# Patient Record
Sex: MALE | Race: BLACK OR AFRICAN AMERICAN | NOT HISPANIC OR LATINO | Employment: FULL TIME | URBAN - METROPOLITAN AREA
[De-identification: names, ages, dates, MRNs, and addresses within clinical notes are randomized per-mention and may not be internally consistent; named-entity substitution may affect disease eponyms.]

---

## 2023-11-06 ENCOUNTER — APPOINTMENT (OUTPATIENT)
Dept: RADIOLOGY | Facility: HOSPITAL | Age: 78
End: 2023-11-06
Payer: COMMERCIAL

## 2023-11-06 ENCOUNTER — HOSPITAL ENCOUNTER (EMERGENCY)
Facility: HOSPITAL | Age: 78
Discharge: HOME | End: 2023-11-07
Attending: EMERGENCY MEDICINE
Payer: COMMERCIAL

## 2023-11-06 DIAGNOSIS — R06.00 DYSPNEA, UNSPECIFIED TYPE: Primary | ICD-10-CM

## 2023-11-06 LAB
ALBUMIN SERPL BCP-MCNC: 4.1 G/DL (ref 3.4–5)
ALP SERPL-CCNC: 83 U/L (ref 33–136)
ALT SERPL W P-5'-P-CCNC: 22 U/L (ref 10–52)
ANION GAP BLDV CALCULATED.4IONS-SCNC: 12 MMOL/L (ref 10–25)
ANION GAP SERPL CALC-SCNC: 17 MMOL/L (ref 10–20)
APPEARANCE UR: ABNORMAL
AST SERPL W P-5'-P-CCNC: 15 U/L (ref 9–39)
BACTERIA #/AREA URNS AUTO: ABNORMAL /HPF
BASE EXCESS BLDV CALC-SCNC: 3.3 MMOL/L (ref -2–3)
BASOPHILS # BLD AUTO: 0.01 X10*3/UL (ref 0–0.1)
BASOPHILS NFR BLD AUTO: 0.1 %
BILIRUB DIRECT SERPL-MCNC: 0.2 MG/DL (ref 0–0.3)
BILIRUB SERPL-MCNC: 0.6 MG/DL (ref 0–1.2)
BILIRUB UR STRIP.AUTO-MCNC: NEGATIVE MG/DL
BNP SERPL-MCNC: 133 PG/ML (ref 0–99)
BODY TEMPERATURE: 37 DEGREES CELSIUS
BUN SERPL-MCNC: 19 MG/DL (ref 6–23)
CA-I BLDV-SCNC: 1.11 MMOL/L (ref 1.1–1.33)
CALCIUM SERPL-MCNC: 9.2 MG/DL (ref 8.6–10.3)
CARDIAC TROPONIN I PNL SERPL HS: <3 NG/L (ref 0–20)
CHLORIDE BLDV-SCNC: 100 MMOL/L (ref 98–107)
CHLORIDE SERPL-SCNC: 100 MMOL/L (ref 98–107)
CO2 SERPL-SCNC: 25 MMOL/L (ref 21–32)
COLOR UR: YELLOW
CREAT SERPL-MCNC: 1.23 MG/DL (ref 0.5–1.3)
D DIMER PPP FEU-MCNC: 380 NG/ML FEU
EOSINOPHIL # BLD AUTO: 0.08 X10*3/UL (ref 0–0.4)
EOSINOPHIL NFR BLD AUTO: 1 %
ERYTHROCYTE [DISTWIDTH] IN BLOOD BY AUTOMATED COUNT: 13.5 % (ref 11.5–14.5)
GFR SERPL CREATININE-BSD FRML MDRD: 61 ML/MIN/1.73M*2
GLUCOSE BLDV-MCNC: 149 MG/DL (ref 74–99)
GLUCOSE SERPL-MCNC: 137 MG/DL (ref 74–99)
GLUCOSE UR STRIP.AUTO-MCNC: NEGATIVE MG/DL
HCO3 BLDV-SCNC: 27.8 MMOL/L (ref 22–26)
HCT VFR BLD AUTO: 42.6 % (ref 41–52)
HCT VFR BLD EST: 48 % (ref 41–52)
HGB BLD-MCNC: 13.9 G/DL (ref 13.5–17.5)
HGB BLDV-MCNC: 15.9 G/DL (ref 13.5–17.5)
HYALINE CASTS #/AREA URNS AUTO: ABNORMAL /LPF
IMM GRANULOCYTES # BLD AUTO: 0.03 X10*3/UL (ref 0–0.5)
IMM GRANULOCYTES NFR BLD AUTO: 0.4 % (ref 0–0.9)
INHALED O2 CONCENTRATION: 21 %
INR PPP: 4.1 (ref 0.9–1.1)
KETONES UR STRIP.AUTO-MCNC: ABNORMAL MG/DL
LACTATE BLDV-SCNC: 2.1 MMOL/L (ref 0.4–2)
LACTATE BLDV-SCNC: 2.1 MMOL/L (ref 0.4–2)
LEUKOCYTE ESTERASE UR QL STRIP.AUTO: NEGATIVE
LYMPHOCYTES # BLD AUTO: 0.99 X10*3/UL (ref 0.8–3)
LYMPHOCYTES NFR BLD AUTO: 12 %
MAGNESIUM SERPL-MCNC: 1.9 MG/DL (ref 1.6–2.4)
MCH RBC QN AUTO: 28 PG (ref 26–34)
MCHC RBC AUTO-ENTMCNC: 32.6 G/DL (ref 32–36)
MCV RBC AUTO: 86 FL (ref 80–100)
MONOCYTES # BLD AUTO: 0.77 X10*3/UL (ref 0.05–0.8)
MONOCYTES NFR BLD AUTO: 9.3 %
MUCOUS THREADS #/AREA URNS AUTO: ABNORMAL /LPF
NEUTROPHILS # BLD AUTO: 6.39 X10*3/UL (ref 1.6–5.5)
NEUTROPHILS NFR BLD AUTO: 77.2 %
NITRITE UR QL STRIP.AUTO: NEGATIVE
NRBC BLD-RTO: 0 /100 WBCS (ref 0–0)
OXYHGB MFR BLDV: 38.5 % (ref 45–75)
PCO2 BLDV: 41 MM HG (ref 41–51)
PH BLDV: 7.44 PH (ref 7.33–7.43)
PH UR STRIP.AUTO: 5 [PH]
PLATELET # BLD AUTO: 231 X10*3/UL (ref 150–450)
PO2 BLDV: 31 MM HG (ref 35–45)
POTASSIUM BLDV-SCNC: 3.4 MMOL/L (ref 3.5–5.3)
POTASSIUM SERPL-SCNC: 3.5 MMOL/L (ref 3.5–5.3)
PROT SERPL-MCNC: 7.3 G/DL (ref 6.4–8.2)
PROT UR STRIP.AUTO-MCNC: ABNORMAL MG/DL
PROTHROMBIN TIME: 47.3 SECONDS (ref 9.8–12.8)
RBC # BLD AUTO: 4.96 X10*6/UL (ref 4.5–5.9)
RBC # UR STRIP.AUTO: NEGATIVE /UL
RBC #/AREA URNS AUTO: ABNORMAL /HPF
SAO2 % BLDV: 39 % (ref 45–75)
SODIUM BLDV-SCNC: 136 MMOL/L (ref 136–145)
SODIUM SERPL-SCNC: 138 MMOL/L (ref 136–145)
SP GR UR STRIP.AUTO: 1.03
UROBILINOGEN UR STRIP.AUTO-MCNC: <2 MG/DL
WBC # BLD AUTO: 8.3 X10*3/UL (ref 4.4–11.3)
WBC #/AREA URNS AUTO: ABNORMAL /HPF

## 2023-11-06 PROCEDURE — 71045 X-RAY EXAM CHEST 1 VIEW: CPT | Mod: FY

## 2023-11-06 PROCEDURE — 83735 ASSAY OF MAGNESIUM: CPT | Performed by: EMERGENCY MEDICINE

## 2023-11-06 PROCEDURE — 83880 ASSAY OF NATRIURETIC PEPTIDE: CPT | Performed by: EMERGENCY MEDICINE

## 2023-11-06 PROCEDURE — 2550000001 HC RX 255 CONTRASTS: Performed by: EMERGENCY MEDICINE

## 2023-11-06 PROCEDURE — 82947 ASSAY GLUCOSE BLOOD QUANT: CPT | Performed by: EMERGENCY MEDICINE

## 2023-11-06 PROCEDURE — 74177 CT ABD & PELVIS W/CONTRAST: CPT

## 2023-11-06 PROCEDURE — 81001 URINALYSIS AUTO W/SCOPE: CPT | Performed by: EMERGENCY MEDICINE

## 2023-11-06 PROCEDURE — 96360 HYDRATION IV INFUSION INIT: CPT

## 2023-11-06 PROCEDURE — 71045 X-RAY EXAM CHEST 1 VIEW: CPT | Performed by: RADIOLOGY

## 2023-11-06 PROCEDURE — 36415 COLL VENOUS BLD VENIPUNCTURE: CPT | Performed by: EMERGENCY MEDICINE

## 2023-11-06 PROCEDURE — 85025 COMPLETE CBC W/AUTO DIFF WBC: CPT | Performed by: EMERGENCY MEDICINE

## 2023-11-06 PROCEDURE — 71275 CT ANGIOGRAPHY CHEST: CPT | Performed by: RADIOLOGY

## 2023-11-06 PROCEDURE — 99221 1ST HOSP IP/OBS SF/LOW 40: CPT

## 2023-11-06 PROCEDURE — 74177 CT ABD & PELVIS W/CONTRAST: CPT | Performed by: RADIOLOGY

## 2023-11-06 PROCEDURE — 71275 CT ANGIOGRAPHY CHEST: CPT

## 2023-11-06 PROCEDURE — 85610 PROTHROMBIN TIME: CPT | Performed by: EMERGENCY MEDICINE

## 2023-11-06 PROCEDURE — 2500000004 HC RX 250 GENERAL PHARMACY W/ HCPCS (ALT 636 FOR OP/ED): Performed by: EMERGENCY MEDICINE

## 2023-11-06 PROCEDURE — 85379 FIBRIN DEGRADATION QUANT: CPT | Performed by: EMERGENCY MEDICINE

## 2023-11-06 PROCEDURE — 84484 ASSAY OF TROPONIN QUANT: CPT | Performed by: EMERGENCY MEDICINE

## 2023-11-06 PROCEDURE — 82248 BILIRUBIN DIRECT: CPT | Performed by: EMERGENCY MEDICINE

## 2023-11-06 PROCEDURE — 83605 ASSAY OF LACTIC ACID: CPT | Performed by: EMERGENCY MEDICINE

## 2023-11-06 PROCEDURE — 99285 EMERGENCY DEPT VISIT HI MDM: CPT | Mod: 25

## 2023-11-06 RX ADMIN — SODIUM CHLORIDE, POTASSIUM CHLORIDE, SODIUM LACTATE AND CALCIUM CHLORIDE 500 ML: 600; 310; 30; 20 INJECTION, SOLUTION INTRAVENOUS at 14:24

## 2023-11-06 RX ADMIN — IOHEXOL 75 ML: 350 INJECTION, SOLUTION INTRAVENOUS at 20:32

## 2023-11-06 ASSESSMENT — PAIN - FUNCTIONAL ASSESSMENT: PAIN_FUNCTIONAL_ASSESSMENT: 0-10

## 2023-11-06 ASSESSMENT — PAIN SCALES - GENERAL: PAINLEVEL_OUTOF10: 0 - NO PAIN

## 2023-11-06 NOTE — ED TRIAGE NOTES
PT ARRIVED TO TRIAGE FOR SOB. PT HAVING DIFFICULTIES SPEAKING FULL SENTENCES. PT IN WHEELCHAIR BECAUSE HE'S FEELING SOB. WIFE STS PT HAS BEEN FEELING SOB FOR THE LAST 4 DAYS AND IS NOT GETTING ANY BETTER. PT ALSO COMPLAINING OF D/N, W/O V. PT BOARDERLINE TACHY IN TRIAGE. EKG IN TRIAGE

## 2023-11-06 NOTE — PROGRESS NOTES
Transfer of care note:    I received this patient in signout -   ED Course as of 11/10/23 1735   Mon Nov 06, 2023 1815 Patient EKG on my interpretation shows normal sinus rhythm normal axis rate mid 70s occasional PVCs with no ischemic changes.  CBC today shows no leukocytosis chemistries are unremarkable troponin negative BNP is almost normal D-dimer is negative INR therapeutic 4.1 however patient is still having trouble breathing, given ongoing symptoms we will do a CT angio of the chest, [MT]   1857 Patient presented to me pending CT chest abdomen pelvis.  Having shortness of breath with overall reassuring work-up.  History of PE on Coumadin therapeutic.  Some abdominal distention left upper quadrant pain on exam. [JM]   2205 CT concerning for ileus.  Surgery consulted. [JM]   2331 Patient had 3 somewhat loose bowel movements in the ED and abdominal distention discomfort resolved.  Shortness of breath returned to baseline.  Feels slightly fatigued but feels comfortable at discharge.  Hemodynamically stable.  Satting well on room air. [JM]      ED Course User Index  [JM] Lázaro Gallegos MD  [MT] Chris Cantu MD         Diagnoses as of 11/10/23 1735   Dyspnea, unspecified type     Given patient's shortness of breath symptoms resolved when the abdominal distention resolved secondary to having bowel movements, suspect patient's abdominal distention and CT findings with dilated bowel loops were more related to enteritis which is now improved with bowel movements.  Low suspicion for significant ileus or bowel obstruction.  Patient has had multiple large bowel movements now in the ED and feels back to baseline.  Patient feels comfortable with discharge which I believe is reasonable given resolution of symptoms and overall clinical picture.  Patient remains nontoxic-appearing.  Surgery team notified of the change in status.  Surgery team evaluated patient and agrees.  Patient was explained findings, exam/study  results, the importance of follow up and the plan moving forward; patient verbalized understanding and agreement. All questions were answered and no new questions at this time. Patient will be discharged with strict return precautions, follow up plan with PCP.

## 2023-11-06 NOTE — ED PROVIDER NOTES
HPI   Chief Complaint   Patient presents with   • Shortness of Breath       HPI: []  78-year-old  male with a history of hypertension, pulm embolism on Coumadin today comes in with shortness of breath.  History for last 2 days he been having trouble breathing.  Denies any chest pain pressure heaviness.  No orthopnea PND.  No hematemesis melena or hematochezia.  He just feels he is having trouble catching his breath.  He is compliant to medications.  He denies recent travel hospitalization.  No leg swelling.  No headache.  No trauma no falls.    Past history: Hypertension, pulm embolism  Social: Patient denies current tobacco alcohol drug abuse.  REVIEW OF SYSTEMS:    GENERAL.: No weight loss, fatigue, anorexia, insomnia, fever.    EYES: No vision loss, double vision, drainage, eye pain.    ENT: No pharyngitis, dry mouth.    CARDIOPULMONARY: No chest pain, palpitations, syncope, near syncope.  Positive for shortness of breath, cough, hemoptysis.    GI: No abdominal pain, change in bowel habits, melena, hematemesis, hematochezia, nausea, vomiting, diarrhea.    : No discharge, dysuria, frequency, urgency, hematuria.    MS: No limb pain, joint pain, joint swelling.    SKIN: No rashes.    PSYCH: No depression, anxiety, suicidality, homicidality.    Review of systems is otherwise negative unless stated above or in history of present illness.  Social history, family history, allergies reviewed.  PHYSICAL EXAM:    GENERAL: Vitals noted, no distress. Alert and oriented  x 3. Non-toxic.      EENT: TMs clear. Posterior oropharynx unremarkable. No meningismus. No LAD.     NECK: Supple. Nontender. No midline tenderness.     CARDIAC: Regular, rate, rhythm. No murmurs rubs or gallops. No JVD    PULMONARY: Lungs clear bilaterally with good aeration. No wheezes rales or rhonchi. No respiratory distress.  No tachypnea stridor or retractions able to speak in full sentences    ABDOMEN: Soft, distended, minimal diffuse  tenderness especially left upper quadrant no rebound or guarding no peritoneal signs. Normoactive bowel sounds. No pulsatile masses.  Negative inguinal hernias negative CVA tenderness    EXTREMITIES: No peripheral edema. Negative Homans bilaterally, no cords.  2+ bounding pulses well-perfused    SKIN: No rash. Intact.     NEURO: No focal neurologic deficits, NIH score of 0. Cranial nerves normal as tested from II through XII.     MEDICAL DECISION MAKING:  EKG on my interpretation shows normal sinus rhythm normal axis rate mid 80s with a occasional PVCs.    CBC today shows leukocytosis stable hemoglobin chemistries unremarkable lactate 2.1 elevated venous gas shows no hypercapnia troponin negative BNP is almost normal chest x-ray negative, INR is 4.1 therapeutic CT angio of the chest/abdomen/pelvis is pending.    Treatment in ED: Arrival IV established, placed on a cardiac monitor.    ED course: Patient remained stable hemodynamic.    Impression: #1 shortness of breath    Plan set MDM: Elderly male history for pulm embolism on Coumadin compliant therapeutic INR 4.1 D-dimer negative comes in with ongoing shortness of breath still symptomatic although suspicion for breakthrough PE is low but given his ongoing symptoms and abdominal distention awaiting CT of the chest abdomen pelvis, following patient be reassessed again anticipate if the CT is negative will be discharged home, signed out to my oncoming colleague Dr. Baker                               Freddie Coma Scale Score: 15                  Patient History   No past medical history on file.  No past surgical history on file.  No family history on file.  Social History     Tobacco Use   • Smoking status: Not on file   • Smokeless tobacco: Not on file   Substance Use Topics   • Alcohol use: Not on file   • Drug use: Not on file       Physical Exam   ED Triage Vitals [11/06/23 1317]   Temp Heart Rate Resp BP   36.8 °C (98.3 °F) 97 22 141/80      SpO2 Temp Source  Heart Rate Source Patient Position   100 % Oral -- Lying      BP Location FiO2 (%)     Right arm --       Physical Exam    ED Course & [facility]   ED Course as of 11/06/23 1820 Mon Nov 06, 2023 1815 Patient EKG on my interpretation shows normal sinus rhythm normal axis rate mid 70s occasional PVCs with no ischemic changes.  CBC today shows no leukocytosis chemistries are unremarkable troponin negative BNP is almost normal D-dimer is negative INR therapeutic 4.1 however patient is still having trouble breathing, given ongoing symptoms we will do a CT angio of the chest, [MT]      ED Course User Index  [MT] Chris Cantu MD         Diagnoses as of 11/06/23 1820   Dyspnea, unspecified type       Medical Decision Making      Procedure  Procedures     Chris Cantu MD  11/06/23 1820       Chris Cantu MD  11/06/23 1821

## 2023-11-07 VITALS
WEIGHT: 250 LBS | OXYGEN SATURATION: 98 % | SYSTOLIC BLOOD PRESSURE: 138 MMHG | DIASTOLIC BLOOD PRESSURE: 88 MMHG | TEMPERATURE: 98.3 F | RESPIRATION RATE: 22 BRPM | HEART RATE: 90 BPM | BODY MASS INDEX: 30.44 KG/M2 | HEIGHT: 76 IN

## 2023-11-07 NOTE — CONSULTS
Reason For Consult  Abdominal distention, concern for ileus    History Of Present Illness  Linda Stephen is a 78 y.o. male presenting with SOB x2 days. He is also endorsing abdominal distention. He states he does have occasional constipation. He took Miralax last week and was able to have a BM. He noted abdominal distention 2 days ago, which self-resolved. He noted the abdominal distention again today with associated nausea without vomiting. He states that since he has been in the ED, he has had at least 4 liquid bowel movements. He had a colonoscopy in 2017 - no concerning findings. In the ED, he is afebrile. Glucose 137, otherwise electrolytes normal. WBC 8.3, H/H 13.9/42.6. CT A/P demonstrated a distended stomach with an air-fluid level. Fluid distention of multiple small bowel loops with no definite transition point identified, may be secondary to ileus or enteritis. Due to possible ileus, general surgery was consulted.     Upon examination, the patient is no longer endorsing N/V. He states he is hungry. States he has had at least 4 episodes of diarrhea since presenting to the ED. His abdomen is mildly distended, soft and non-tender to palpation. He denies any prior abdominal surgeries.     Past Medical History  HTN  PE on Coumadin    Surgical History  Hip surgery     Social History  He has no history on file for tobacco use, alcohol use, and drug use.    Family History  No family history on file.     Allergies  Patient has no known allergies.    Review of Systems  ABD: + diarrhea. Denies pain, N/V.     Physical Exam  Constitutional: Awake/alert/oriented x3, no distress, cooperative.  Skin: Warm and dry.  Eyes: EOMI, clear sclera.  ENMT: Mucus membranes moist, no apparent injury.  Head/Neck: Neck supple, no apparent injury.  Respiratory: Unlabored breathing on room air.  Cardiovascular: RRR.  GI: Mildly distended, soft, non tender to palpation, non-peritonitic.  Musculoskeletal: ROM intact, normal  "strength.  Extremities: DA SILVA.  Neurological: Alert and oriented x3, no focal deficits.  Psychological: Appropriate mood and behavior.     Last Recorded Vitals  Blood pressure (!) 143/94, pulse 89, temperature 36.8 °C (98.3 °F), temperature source Oral, resp. rate 21, height 1.93 m (6' 4\"), weight 113 kg (250 lb), SpO2 97 %.    Relevant Results  .  Results for orders placed or performed during the hospital encounter of 11/06/23 (from the past 24 hour(s))   CBC and Auto Differential   Result Value Ref Range    WBC 8.3 4.4 - 11.3 x10*3/uL    nRBC 0.0 0.0 - 0.0 /100 WBCs    RBC 4.96 4.50 - 5.90 x10*6/uL    Hemoglobin 13.9 13.5 - 17.5 g/dL    Hematocrit 42.6 41.0 - 52.0 %    MCV 86 80 - 100 fL    MCH 28.0 26.0 - 34.0 pg    MCHC 32.6 32.0 - 36.0 g/dL    RDW 13.5 11.5 - 14.5 %    Platelets 231 150 - 450 x10*3/uL    Neutrophils % 77.2 40.0 - 80.0 %    Immature Granulocytes %, Automated 0.4 0.0 - 0.9 %    Lymphocytes % 12.0 13.0 - 44.0 %    Monocytes % 9.3 2.0 - 10.0 %    Eosinophils % 1.0 0.0 - 6.0 %    Basophils % 0.1 0.0 - 2.0 %    Neutrophils Absolute 6.39 (H) 1.60 - 5.50 x10*3/uL    Immature Granulocytes Absolute, Automated 0.03 0.00 - 0.50 x10*3/uL    Lymphocytes Absolute 0.99 0.80 - 3.00 x10*3/uL    Monocytes Absolute 0.77 0.05 - 0.80 x10*3/uL    Eosinophils Absolute 0.08 0.00 - 0.40 x10*3/uL    Basophils Absolute 0.01 0.00 - 0.10 x10*3/uL   Basic metabolic panel   Result Value Ref Range    Glucose 137 (H) 74 - 99 mg/dL    Sodium 138 136 - 145 mmol/L    Potassium 3.5 3.5 - 5.3 mmol/L    Chloride 100 98 - 107 mmol/L    Bicarbonate 25 21 - 32 mmol/L    Anion Gap 17 10 - 20 mmol/L    Urea Nitrogen 19 6 - 23 mg/dL    Creatinine 1.23 0.50 - 1.30 mg/dL    eGFR 61 >60 mL/min/1.73m*2    Calcium 9.2 8.6 - 10.3 mg/dL   Magnesium   Result Value Ref Range    Magnesium 1.90 1.60 - 2.40 mg/dL   Hepatic function panel   Result Value Ref Range    Albumin 4.1 3.4 - 5.0 g/dL    Bilirubin, Total 0.6 0.0 - 1.2 mg/dL    Bilirubin, Direct " 0.2 0.0 - 0.3 mg/dL    Alkaline Phosphatase 83 33 - 136 U/L    ALT 22 10 - 52 U/L    AST 15 9 - 39 U/L    Total Protein 7.3 6.4 - 8.2 g/dL   Protime-INR   Result Value Ref Range    Protime 47.3 (H) 9.8 - 12.8 seconds    INR 4.1 (H) 0.9 - 1.1   Troponin I, High Sensitivity   Result Value Ref Range    Troponin I, High Sensitivity <3 0 - 20 ng/L   D-Dimer, Quantitative Non VTE   Result Value Ref Range    D-Dimer Non VTE, Quant (ng/mL FEU) 380 <=500 ng/mL FEU   B-Type Natriuretic Peptide   Result Value Ref Range     (H) 0 - 99 pg/mL   Blood Gas Venous Full Panel   Result Value Ref Range    POCT pH, Venous 7.44 (H) 7.33 - 7.43 pH    POCT pCO2, Venous 41 41 - 51 mm Hg    POCT pO2, Venous 31 (L) 35 - 45 mm Hg    POCT SO2, Venous 39 (L) 45 - 75 %    POCT Oxy Hemoglobin, Venous 38.5 (L) 45.0 - 75.0 %    POCT Hematocrit Calculated, Venous 48.0 41.0 - 52.0 %    POCT Sodium, Venous 136 136 - 145 mmol/L    POCT Potassium, Venous 3.4 (L) 3.5 - 5.3 mmol/L    POCT Chloride, Venous 100 98 - 107 mmol/L    POCT Ionized Calicum, Venous 1.11 1.10 - 1.33 mmol/L    POCT Glucose, Venous 149 (H) 74 - 99 mg/dL    POCT Lactate, Venous 2.1 (H) 0.4 - 2.0 mmol/L    POCT Base Excess, Venous 3.3 (H) -2.0 - 3.0 mmol/L    POCT HCO3 Calculated, Venous 27.8 (H) 22.0 - 26.0 mmol/L    POCT Hemoglobin, Venous 15.9 13.5 - 17.5 g/dL    POCT Anion Gap, Venous 12.0 10.0 - 25.0 mmol/L    Patient Temperature 37.0 degrees Celsius    FiO2 21 %   Urinalysis with Reflex Microscopic   Result Value Ref Range    Color, Urine Yellow Straw, Yellow    Appearance, Urine Hazy (N) Clear    Specific Gravity, Urine 1.029 1.005 - 1.035    pH, Urine 5.0 5.0, 5.5, 6.0, 6.5, 7.0, 7.5, 8.0    Protein, Urine 30 (1+) (N) NEGATIVE mg/dL    Glucose, Urine NEGATIVE NEGATIVE mg/dL    Blood, Urine NEGATIVE NEGATIVE    Ketones, Urine 80 (2+) (A) NEGATIVE mg/dL    Bilirubin, Urine NEGATIVE NEGATIVE    Urobilinogen, Urine <2.0 <2.0 mg/dL    Nitrite, Urine NEGATIVE NEGATIVE     Leukocyte Esterase, Urine NEGATIVE NEGATIVE   Blood Gas Lactic Acid, Venous   Result Value Ref Range    POCT Lactate, Venous 2.1 (H) 0.4 - 2.0 mmol/L   Microscopic Only, Urine   Result Value Ref Range    WBC, Urine 1-5 1-5, NONE /HPF    RBC, Urine 1-2 NONE, 1-2, 3-5 /HPF    Bacteria, Urine 1+ (A) NONE SEEN /HPF    Mucus, Urine 3+ Reference range not established. /LPF    Hyaline Casts, Urine OCCASIONAL (A) NONE /LPF      .CT angio chest for pulmonary embolism    Result Date: 11/6/2023  Interpreted By:  Tata Woodward, STUDY: CT ANGIO CHEST FOR PULMONARY EMBOLISM; CT ABDOMEN PELVIS W IV CONTRAST;  11/6/2023 8:42 pm   INDICATION: Signs/Symptoms:sob and pain; Signs/Symptoms:abd pain distension   COMPARISON: Chest x-ray 11/06/2023.   ACCESSION NUMBER(S): [id number]; [id number]   ORDERING CLINICIAN: VINICIO SOW   TECHNIQUE: Helical data acquisition of the chest was obtained following the intravenous contrast administration. Images were reformatted in axial, coronal, and sagittal planes. MIP images were created and reviewed.  Dual energy reconstructions were also performed including low energy virtual mono-energetic images and iodine density maps.   Axial CT images were obtained through the abdomen and pelvis following the intravenous contrast administration. Coronal and sagittal reformats were performed.   75 mLOmnipaque 350 contrast material was administered intravenously with no immediate complications.   FINDINGS: CT ANGIOGRAM CHEST:   POTENTIAL LIMITATIONS OF THE STUDY: Motion artifact.   HEART AND VESSELS: No discrete filling defects within the main pulmonary artery or its branches.   There is 4.0 cm aneurysmal dilation of the ascending thoracic aorta. There is 3.2 cm aneurysmal dilation of the descending thoracic aorta. The phase of the intravenous contrast is not tailored to evaluate the thoracic aorta.Mild atherosclerotic calcifications are noted at the thoracic aorta.   The heart is enlarged. Coronary  artery calcifications are noted.   No evidence of pericardial effusion.   MEDIASTINUM AND MEENAKSHI, LOWER NECK AND AXILLA: The visualized thyroid gland is within normal limits.   No evidence of thoracic lymphadenopathy by CT criteria.   There is circumferential wall thickening of the distal esophagus.   LUNGS AND AIRWAYS: The trachea and central airways are patent.   No consolidation, pleural effusion or pneumothorax.  There is mild atelectasis at the left lower lobe and lingula   CHEST WALL AND OSSEOUS STRUCTURES: Multilevel degenerative changes at the spine.   CT ABDOMEN AND PELVIS:   LIVER: No focal lesion is identified.   BILE DUCTS: No significant dilation.   GALLBLADDER: Present.   PANCREAS: No peripancreatic inflammatory changes.   SPLEEN: Within normal limits.   ADRENAL GLANDS: Unremarkable.   KIDNEYS AND URETERS: Symmetrical renal enhancement.  No hydronephrosis or hydroureter is identified. There are small bilateral renal cysts measuring up to 1.2 cm at the right kidney.   PELVIS: The pelvis is partially obscured by streak artifact from the bilateral hip prostheses.   BLADDER: The urinary bladder is partially distended and is mostly obscured by streak artifact.   REPRODUCTIVE ORGANS: The prostate is partially obscured by streak artifact, measuring 5.5 cm in transverse diameter.   BOWEL: The stomach is distended with an air-fluid level. There is fluid distention of multiple small bowel loops with no definite transition point identified. Air-fluid levels are noted at the colon. The appendix is not distended.   VESSELS: Atherosclerotic calcifications at the abdominal aorta and its branches. No abdominal aortic aneurysm.   PERITONEUM/RETROPERITONEUM/LYMPH NODES: No ascites or pneumoperitoneum. No retroperitoneal hemorrhage. No pathologically enlarged lymph nodes are identified.   BONES AND ABDOMINAL WALL: The patient is status post bilateral total hip arthroplasties. Multilevel degenerative changes are noted at  the spine. There is mild anterolisthesis of L4 on L5. There is mild retrolisthesis of L2 on L3, of L3 on L4 and of L5 on S1. There are small fat containing bilateral inguinal hernias.       CT ANGIOGRAM CHEST: 1.  No evidence for pulmonary emboli. 2. Mild atelectasis at the left lower lobe and lingula. 3. Cardiomegaly. Coronary artery calcifications. 4. 4.0 cm ascending thoracic aortic aneurysm. 3.2 cm descending thoracic aortic aneurysm. 5. Circumferential wall thickening of the distal esophagus, may be seen with esophagitis. Upper endoscopy can help in better evaluation.       CT ABDOMEN AND PELVIS: 1. Distended stomach with an air-fluid level. Fluid distention of multiple small bowel loops with no definite transition point identified, may be secondary to ileus or enteritis. 2. Air-fluid levels at the colon, may be seen with infectious/inflammatory colitis.         MACRO: None.   Signed by: Tata Woodward 11/6/2023 9:43 PM Dictation workstation:   [id number]    CT abdomen pelvis w IV contrast    Result Date: 11/6/2023  Interpreted By:  Tata Woodward, STUDY: CT ANGIO CHEST FOR PULMONARY EMBOLISM; CT ABDOMEN PELVIS W IV CONTRAST;  11/6/2023 8:42 pm   INDICATION: Signs/Symptoms:sob and pain; Signs/Symptoms:abd pain distension   COMPARISON: Chest x-ray 11/06/2023.   ACCESSION NUMBER(S): [id number]; [id number]   ORDERING CLINICIAN: VINICIO SOW   TECHNIQUE: Helical data acquisition of the chest was obtained following the intravenous contrast administration. Images were reformatted in axial, coronal, and sagittal planes. MIP images were created and reviewed.  Dual energy reconstructions were also performed including low energy virtual mono-energetic images and iodine density maps.   Axial CT images were obtained through the abdomen and pelvis following the intravenous contrast administration. Coronal and sagittal reformats were performed.   75 mLOmnipaque 350 contrast material was administered intravenously  with no immediate complications.   FINDINGS: CT ANGIOGRAM CHEST:   POTENTIAL LIMITATIONS OF THE STUDY: Motion artifact.   HEART AND VESSELS: No discrete filling defects within the main pulmonary artery or its branches.   There is 4.0 cm aneurysmal dilation of the ascending thoracic aorta. There is 3.2 cm aneurysmal dilation of the descending thoracic aorta. The phase of the intravenous contrast is not tailored to evaluate the thoracic aorta.Mild atherosclerotic calcifications are noted at the thoracic aorta.   The heart is enlarged. Coronary artery calcifications are noted.   No evidence of pericardial effusion.   MEDIASTINUM AND MEENAKSHI, LOWER NECK AND AXILLA: The visualized thyroid gland is within normal limits.   No evidence of thoracic lymphadenopathy by CT criteria.   There is circumferential wall thickening of the distal esophagus.   LUNGS AND AIRWAYS: The trachea and central airways are patent.   No consolidation, pleural effusion or pneumothorax.  There is mild atelectasis at the left lower lobe and lingula   CHEST WALL AND OSSEOUS STRUCTURES: Multilevel degenerative changes at the spine.   CT ABDOMEN AND PELVIS:   LIVER: No focal lesion is identified.   BILE DUCTS: No significant dilation.   GALLBLADDER: Present.   PANCREAS: No peripancreatic inflammatory changes.   SPLEEN: Within normal limits.   ADRENAL GLANDS: Unremarkable.   KIDNEYS AND URETERS: Symmetrical renal enhancement.  No hydronephrosis or hydroureter is identified. There are small bilateral renal cysts measuring up to 1.2 cm at the right kidney.   PELVIS: The pelvis is partially obscured by streak artifact from the bilateral hip prostheses.   BLADDER: The urinary bladder is partially distended and is mostly obscured by streak artifact.   REPRODUCTIVE ORGANS: The prostate is partially obscured by streak artifact, measuring 5.5 cm in transverse diameter.   BOWEL: The stomach is distended with an air-fluid level. There is fluid distention of multiple  small bowel loops with no definite transition point identified. Air-fluid levels are noted at the colon. The appendix is not distended.   VESSELS: Atherosclerotic calcifications at the abdominal aorta and its branches. No abdominal aortic aneurysm.   PERITONEUM/RETROPERITONEUM/LYMPH NODES: No ascites or pneumoperitoneum. No retroperitoneal hemorrhage. No pathologically enlarged lymph nodes are identified.   BONES AND ABDOMINAL WALL: The patient is status post bilateral total hip arthroplasties. Multilevel degenerative changes are noted at the spine. There is mild anterolisthesis of L4 on L5. There is mild retrolisthesis of L2 on L3, of L3 on L4 and of L5 on S1. There are small fat containing bilateral inguinal hernias.       CT ANGIOGRAM CHEST: 1.  No evidence for pulmonary emboli. 2. Mild atelectasis at the left lower lobe and lingula. 3. Cardiomegaly. Coronary artery calcifications. 4. 4.0 cm ascending thoracic aortic aneurysm. 3.2 cm descending thoracic aortic aneurysm. 5. Circumferential wall thickening of the distal esophagus, may be seen with esophagitis. Upper endoscopy can help in better evaluation.       CT ABDOMEN AND PELVIS: 1. Distended stomach with an air-fluid level. Fluid distention of multiple small bowel loops with no definite transition point identified, may be secondary to ileus or enteritis. 2. Air-fluid levels at the colon, may be seen with infectious/inflammatory colitis.         MACRO: None.   Signed by: Tata Woodward 11/6/2023 9:43 PM Dictation workstation:   [id number]    XR chest 1 view    Result Date: 11/6/2023  Interpreted By:  Epi Melchor, STUDY: XR CHEST 1 VIEW;  11/6/2023 2:03 pm   INDICATION: Signs/Symptoms:sob.   COMPARISON: None.   ACCESSION NUMBER(S): [id number]   ORDERING CLINICIAN: VINICIO SOW   FINDINGS: AP portable view of the chest is obtained.  Limited exam due to portable nature and poor inspiration.. Magnified cardiac silhouette. No infiltrates, effusions or  pneumothorax.       1.  No evidence of acute cardiopulmonary process.       MACRO: None   Signed by: Epi Melchor 11/6/2023 2:14 PM Dictation workstation:   [id number]    XR FEMUR GENERAL 2V AP/LAT RIGHT    Result Date: 10/13/2023  * * *Final Report* * * DATE OF EXAM: Oct 13 2023 11:17AM   AOX   [id number]  -  XR FEMUR 2V AP/LAT RT  / ACCESSION #  [id number] PROCEDURE REASON: Pain      * * * * Physician Interpretation * * * *  Examination:  Right femur History:   right femur pain Pain Technique:   XR FEMUR 2V AP/LAT RT -- Comparison: No prior images are available for comparison at this time. FINDING/ RESULT: There is a noncemented right total hip arthroplasty without apparent loosening.  Mild mature appearing heterotopic ossification.  Small osteophytes at the right knee joint.  Remainder of the bones, joint spaces and alignment are within normal limits. Incidental note of extensive vascular calcifications.    IMPRESSION: Intact right femur. : PSCVICENTE   Transcribe Date/Time: Oct 13 2023  3:03P Dictated by : ANDRÉS MARTÍNEZ MD This examination was interpreted and the report reviewed and electronically signed by: ANDRÉS MARTÍNEZ MD on Oct 13 2023  3:05PM  EST       Assessment/Plan   - Abdomen mildly distended, soft, non tender to palpation, non-peritonitic. Endorses diarrhea, denies N/V.   - Patient is having bowel movements and is not endorsing N/V. He is hungry. He does feel improved. Due to re-assuring symptoms, patient OK to be discharged with strict return precautions, such as increasing abdominal distention/pain, N/V, no bowel function. Patient is advised to eat a light diet for a few days and advance as tolerated. ED and patient agree.     Dispo: OK to be discharged from a surgical perspective with strict return precautions.      Stacy Cain, APRN-CNP

## 2023-11-07 NOTE — DISCHARGE INSTRUCTIONS
You were seen emergency room today for evaluation of abdominal distention and shortness of breath.  Your distention resolved after having multiple bowel movements.  Your imaging showed some signs of enteritis and possible developing ileus however lower suspicion for ileus given bowel function and resolution of distention.  Please return if you have any worsening symptoms, shortness of breath, or if you have any other concerns.